# Patient Record
Sex: FEMALE | URBAN - METROPOLITAN AREA
[De-identification: names, ages, dates, MRNs, and addresses within clinical notes are randomized per-mention and may not be internally consistent; named-entity substitution may affect disease eponyms.]

---

## 2023-10-24 ENCOUNTER — TELEPHONE (OUTPATIENT)
Dept: HEMATOLOGY/ONCOLOGY | Facility: CLINIC | Age: 68
End: 2023-10-24

## 2023-10-25 ENCOUNTER — TELEPHONE (OUTPATIENT)
Dept: HEMATOLOGY/ONCOLOGY | Facility: CLINIC | Age: 68
End: 2023-10-25

## 2023-11-09 ENCOUNTER — TELEPHONE (OUTPATIENT)
Dept: HEMATOLOGY/ONCOLOGY | Facility: CLINIC | Age: 68
End: 2023-11-09

## 2023-11-09 NOTE — TELEPHONE ENCOUNTER
"----- Message from Pérez KRISTEN Saucedo sent at 11/7/2023  4:55 PM CST -----  Regarding: FW: Lung-Second opinion    ----- Message -----  From: Anish Chu  Sent: 11/7/2023   2:39 PM CST  To: Pérez Saucedo  Subject: RE: Lung-Second opinion                          Felix Ortez no I do not have a case on this Pt either   ----- Message -----  From: Pérez Saucedo  Sent: 11/7/2023  12:51 PM CST  To: Anish Chu; Belinda Canchola, RN  Subject: RE: Lung-Second opinion                          Felix Belinda,    I don't have a case started on the patient. Anish, have you spoken with the patient?  ----- Message -----  From: Belinda Canchola RN  Sent: 11/7/2023  12:38 PM CST  To: [facility]  Subject: Lung-Second opinion                              Team,    This is a patient of Arline's, wrong phone number was listed. Do you all have an open case on her? I don't see any records?    Belinda      ----- Message -----  From: Coniglio, Corinne E, RN  Sent: 11/7/2023  11:54 AM CST  To: Arline Brooks RN      ----- Message -----  From: Alvarado Araujo  Sent: 11/7/2023  11:53 AM CST  To: [facility]    Consult/Advisory:      Name Of Caller: Self      Contact Preference?: [phone]      What is the nature of the call?: Returning call to Arline      Additional Notes: Stating her phone # was accidentally listed as ending in [phone]    "Thank you for all that you do for our patients"                  "

## 2023-11-09 NOTE — TELEPHONE ENCOUNTER
Oncology nurse navigator attempted to contact patient to discuss second opinion. Number corrected in patient's chart. No answer, voicemail box full.